# Patient Record
Sex: MALE | Race: WHITE | NOT HISPANIC OR LATINO | Employment: UNEMPLOYED | URBAN - METROPOLITAN AREA
[De-identification: names, ages, dates, MRNs, and addresses within clinical notes are randomized per-mention and may not be internally consistent; named-entity substitution may affect disease eponyms.]

---

## 2018-08-01 ENCOUNTER — OFFICE VISIT (OUTPATIENT)
Dept: INTERNAL MEDICINE | Facility: CLINIC | Age: 53
End: 2018-08-01
Attending: INTERNAL MEDICINE
Payer: COMMERCIAL

## 2018-08-01 VITALS
WEIGHT: 192.69 LBS | DIASTOLIC BLOOD PRESSURE: 70 MMHG | OXYGEN SATURATION: 98 % | BODY MASS INDEX: 26.98 KG/M2 | HEIGHT: 71 IN | HEART RATE: 58 BPM | SYSTOLIC BLOOD PRESSURE: 133 MMHG

## 2018-08-01 DIAGNOSIS — S46.911A STRAIN OF RIGHT SHOULDER, INITIAL ENCOUNTER: ICD-10-CM

## 2018-08-01 DIAGNOSIS — Z12.11 SCREENING FOR COLON CANCER: ICD-10-CM

## 2018-08-01 DIAGNOSIS — N40.1 BENIGN PROSTATIC HYPERPLASIA WITH URINARY HESITANCY: ICD-10-CM

## 2018-08-01 DIAGNOSIS — R39.11 BENIGN PROSTATIC HYPERPLASIA WITH URINARY HESITANCY: ICD-10-CM

## 2018-08-01 DIAGNOSIS — Z00.00 ANNUAL PHYSICAL EXAM: Primary | ICD-10-CM

## 2018-08-01 PROCEDURE — 99999 PR PBB SHADOW E&M-EST. PATIENT-LVL IV: CPT | Mod: PBBFAC,,, | Performed by: INTERNAL MEDICINE

## 2018-08-01 PROCEDURE — 99386 PREV VISIT NEW AGE 40-64: CPT | Mod: S$GLB,,, | Performed by: INTERNAL MEDICINE

## 2018-08-01 RX ORDER — MELOXICAM 15 MG/1
15 TABLET ORAL DAILY
Qty: 14 TABLET | Refills: 1 | Status: SHIPPED | OUTPATIENT
Start: 2018-08-01

## 2018-08-01 RX ORDER — METHOCARBAMOL 500 MG/1
500 TABLET, FILM COATED ORAL 3 TIMES DAILY PRN
Qty: 30 TABLET | Refills: 0 | Status: SHIPPED | OUTPATIENT
Start: 2018-08-01 | End: 2018-08-07 | Stop reason: ALTCHOICE

## 2018-08-01 NOTE — PROGRESS NOTES
"Subjective:   Patient ID: Reuben Harris is a 52 y.o. male  Chief complaint:   Chief Complaint   Patient presents with    [facility]    Neck Pain       HPI  Here for annual exam and to [facility]     Has been exercising regularly - cross fit   Reports shoulder and neck pain over past few weeks     Started massage therapy - few sessions - not helpful  Using ball and stretching   No weakness or numbness   Neck tightness neck pain   No dec  strength   Not dropping objects    Has tried 2 advil prior to playing golf or if going sailing which is mildly helpful    Reports occ intermittency, hesitancy. Stream is weaker at times but not currently.     No hematuria, nocturia, incomplete bladder emptying, increased frequency or urgency, burning with urination, cloudy urine. No fevers or chills, testicular pain or pain with defecation.  No family hx of prostate cancer      Review of Systems    Objective:  Vitals:    08/01/18 1507   BP: 133/70   Pulse: (!) 58   SpO2: 98%   Weight: 87.4 kg (192 lb 10.9 oz)   Height: 5' 11" (1.803 m)     Body mass index is 26.87 kg/m².    Physical Exam   Constitutional: He is oriented to person, place, and time. He appears well-developed and well-nourished.   HENT:   Head: Normocephalic and atraumatic.   Right Ear: External ear normal.   Left Ear: External ear normal.   Nose: Nose normal.   Mouth/Throat: Oropharynx is clear and moist.   No carotid bruits   Eyes: Conjunctivae and EOM are normal.   Neck: Neck supple. No thyromegaly present.   Cardiovascular: Normal rate, regular rhythm, normal heart sounds and intact distal pulses.    Pulmonary/Chest: Effort normal and breath sounds normal.   Abdominal: Soft. Bowel sounds are normal.   Genitourinary: Rectum normal.   Genitourinary Comments: Prostate firm, no nodules, mildly enlarged   Musculoskeletal: He exhibits no edema or tenderness.   No ttp of spine    strength symmetric   Full range of motion of B UE - able to fully abduct and " adduct   Sensation to light tough in tact B      Lymphadenopathy:     He has no cervical adenopathy.   Neurological: He is alert and oriented to person, place, and time.   Reflex Scores:       Bicep reflexes are 2+ on the right side and 2+ on the left side.       Brachioradialis reflexes are 2+ on the right side and 2+ on the left side.  Skin: Skin is warm and dry.        Psychiatric: His behavior is normal. Thought content normal.   Vitals reviewed.      Assessment:  1. Annual physical exam    2. Strain of right shoulder, initial encounter    3. Benign prostatic hyperplasia with urinary hesitancy    4. Screening for colon cancer        Plan:  Reuben was seen today for establish care and neck pain.    Diagnoses and all orders for this visit:    Annual physical exam  -     CBC auto differential; Future  -     Comprehensive metabolic panel; Future  -     Lipid panel; Future  -     PSA, Screening; Future  Recommend daily sunscreen, cardiovascular exercise min 30 min 5 days per week. Seatbelts routinely.    Strain of right shoulder, initial encounter  -     Ambulatory Referral to Physical/Occupational Therapy    Benign prostatic hyperplasia with urinary hesitancy  -     PSA, Screening; Future  Sx mild and intermittent   Consider trial of flomax if needed in future   Check psa     Screening for colon cancer  -     Case request GI: COLONOSCOPY    Other orders  -     meloxicam (MOBIC) 15 MG tablet; Take 1 tablet (15 mg total) by mouth once daily.  -     methocarbamol (ROBAXIN) 500 MG Tab; Take 1 tablet (500 mg total) by mouth 3 (three) times daily as needed.  -     ranitidine (ZANTAC) 75 MG tablet; Take 1 tablet (75 mg total) by mouth 2 (two) times daily.    Trial of scheduled nsaids, mm relaxer prn and PT  If no improvement he will let me know  Take nsaids with otc zantac and take med with food   To obtain tdap and flu vaccines through pharmacy  Health Maintenance   Topic Date Due    Lipid Panel  1965     TETANUS VACCINE  08/13/1985    Colonoscopy  08/13/2017    Influenza Vaccine  08/01/2018

## 2018-08-03 DIAGNOSIS — Z12.11 COLON CANCER SCREENING: ICD-10-CM

## 2018-08-05 ENCOUNTER — PATIENT MESSAGE (OUTPATIENT)
Dept: INTERNAL MEDICINE | Facility: CLINIC | Age: 53
End: 2018-08-05

## 2018-08-07 ENCOUNTER — LAB VISIT (OUTPATIENT)
Dept: LAB | Facility: OTHER | Age: 53
End: 2018-08-07
Attending: INTERNAL MEDICINE
Payer: COMMERCIAL

## 2018-08-07 ENCOUNTER — OFFICE VISIT (OUTPATIENT)
Dept: INTERNAL MEDICINE | Facility: CLINIC | Age: 53
End: 2018-08-07
Attending: INTERNAL MEDICINE
Payer: COMMERCIAL

## 2018-08-07 VITALS
WEIGHT: 190.94 LBS | HEART RATE: 68 BPM | SYSTOLIC BLOOD PRESSURE: 116 MMHG | OXYGEN SATURATION: 98 % | HEIGHT: 71 IN | DIASTOLIC BLOOD PRESSURE: 77 MMHG | BODY MASS INDEX: 26.73 KG/M2

## 2018-08-07 DIAGNOSIS — N40.1 BENIGN PROSTATIC HYPERPLASIA WITH URINARY HESITANCY: ICD-10-CM

## 2018-08-07 DIAGNOSIS — M79.601 PAIN OF RIGHT UPPER EXTREMITY: Primary | ICD-10-CM

## 2018-08-07 DIAGNOSIS — Z00.00 ANNUAL PHYSICAL EXAM: ICD-10-CM

## 2018-08-07 DIAGNOSIS — R39.11 BENIGN PROSTATIC HYPERPLASIA WITH URINARY HESITANCY: ICD-10-CM

## 2018-08-07 LAB
ALBUMIN SERPL BCP-MCNC: 4.2 G/DL
ALP SERPL-CCNC: 49 U/L
ALT SERPL W/O P-5'-P-CCNC: 10 U/L
ANION GAP SERPL CALC-SCNC: 9 MMOL/L
AST SERPL-CCNC: 13 U/L
BASOPHILS # BLD AUTO: 0.02 K/UL
BASOPHILS NFR BLD: 0.4 %
BILIRUB SERPL-MCNC: 0.6 MG/DL
BUN SERPL-MCNC: 25 MG/DL
CALCIUM SERPL-MCNC: 9.9 MG/DL
CHLORIDE SERPL-SCNC: 105 MMOL/L
CHOLEST SERPL-MCNC: 222 MG/DL
CHOLEST/HDLC SERPL: 3.8 {RATIO}
CO2 SERPL-SCNC: 27 MMOL/L
COMPLEXED PSA SERPL-MCNC: 0.4 NG/ML
CREAT SERPL-MCNC: 1.2 MG/DL
DIFFERENTIAL METHOD: NORMAL
EOSINOPHIL # BLD AUTO: 0.3 K/UL
EOSINOPHIL NFR BLD: 6 %
ERYTHROCYTE [DISTWIDTH] IN BLOOD BY AUTOMATED COUNT: 12.7 %
EST. GFR  (AFRICAN AMERICAN): >60 ML/MIN/1.73 M^2
EST. GFR  (NON AFRICAN AMERICAN): >60 ML/MIN/1.73 M^2
GLUCOSE SERPL-MCNC: 96 MG/DL
HCT VFR BLD AUTO: 45.7 %
HDLC SERPL-MCNC: 58 MG/DL
HDLC SERPL: 26.1 %
HGB BLD-MCNC: 15.3 G/DL
LDLC SERPL CALC-MCNC: 147.2 MG/DL
LYMPHOCYTES # BLD AUTO: 1.6 K/UL
LYMPHOCYTES NFR BLD: 29.5 %
MCH RBC QN AUTO: 30.8 PG
MCHC RBC AUTO-ENTMCNC: 33.5 G/DL
MCV RBC AUTO: 92 FL
MONOCYTES # BLD AUTO: 0.5 K/UL
MONOCYTES NFR BLD: 9.8 %
NEUTROPHILS # BLD AUTO: 3 K/UL
NEUTROPHILS NFR BLD: 54.1 %
NONHDLC SERPL-MCNC: 164 MG/DL
PLATELET # BLD AUTO: 198 K/UL
PMV BLD AUTO: 12.1 FL
POTASSIUM SERPL-SCNC: 4.9 MMOL/L
PROT SERPL-MCNC: 7.7 G/DL
RBC # BLD AUTO: 4.97 M/UL
SODIUM SERPL-SCNC: 141 MMOL/L
TRIGL SERPL-MCNC: 84 MG/DL
WBC # BLD AUTO: 5.49 K/UL

## 2018-08-07 PROCEDURE — 85025 COMPLETE CBC W/AUTO DIFF WBC: CPT

## 2018-08-07 PROCEDURE — 80053 COMPREHEN METABOLIC PANEL: CPT

## 2018-08-07 PROCEDURE — 36415 COLL VENOUS BLD VENIPUNCTURE: CPT

## 2018-08-07 PROCEDURE — 84153 ASSAY OF PSA TOTAL: CPT

## 2018-08-07 PROCEDURE — 80061 LIPID PANEL: CPT

## 2018-08-07 PROCEDURE — 99999 PR PBB SHADOW E&M-EST. PATIENT-LVL III: CPT | Mod: PBBFAC,,, | Performed by: INTERNAL MEDICINE

## 2018-08-07 PROCEDURE — 99499 UNLISTED E&M SERVICE: CPT | Mod: S$GLB,,, | Performed by: INTERNAL MEDICINE

## 2018-08-07 RX ORDER — METHYLPREDNISOLONE 4 MG/1
TABLET ORAL
Qty: 1 PACKAGE | Refills: 0 | Status: SHIPPED | OUTPATIENT
Start: 2018-08-07 | End: 2018-08-28

## 2018-08-07 RX ORDER — CYCLOBENZAPRINE HCL 5 MG
5 TABLET ORAL 3 TIMES DAILY PRN
Qty: 45 TABLET | Refills: 0 | Status: SHIPPED | OUTPATIENT
Start: 2018-08-07 | End: 2018-08-17

## 2018-08-08 NOTE — PROGRESS NOTES
"Subjective:   Patient ID: Reuben Harris is a 52 y.o. male  Chief complaint:   Chief Complaint   Patient presents with    Arm Pain     right arm    Shoulder Pain     right soulder       HPI  Pt scheduled for appt instead of update received per lcv instructions   Sx same but not improved on recent tx - methocarbamol not effective   meloxicam not helpful  Still with right post shoulder and arm pain  No new sx since last recent appt - starting PT next week    Review of Systems    Objective:  Vitals:    08/07/18 1032   BP: 116/77   Pulse: 68   SpO2: 98%   Weight: 86.6 kg (190 lb 14.7 oz)   Height: 5' 11" (1.803 m)     Body mass index is 26.63 kg/m².    Physical Exam   Constitutional: He is oriented to person, place, and time. He appears well-developed and well-nourished. No distress.   HENT:   Head: Normocephalic and atraumatic.   Eyes: Conjunctivae and EOM are normal.   Neck: Neck supple.   Cardiovascular: Normal rate, regular rhythm and intact distal pulses.    Pulmonary/Chest: Effort normal and breath sounds normal.   Musculoskeletal: He exhibits no edema.   From of shae UE  Bilateral paraspinal and traps tight   strength symmetric and in tact   5/5 strength of B UE  No rash or skin change   No ttp of right UE   Lymphadenopathy:     He has no cervical adenopathy.   Neurological: He is alert and oriented to person, place, and time.   Skin: Skin is warm and dry. He is not diaphoretic.   Psychiatric: He has a normal mood and affect. His behavior is normal. Judgment and thought content normal.   Vitals reviewed.    Unchanged since last appt    Assessment:  1. Pain of right upper extremity        Plan:  Reuben was seen today for arm pain and shoulder pain.    Diagnoses and all orders for this visit:    Pain of right upper extremity    Other orders  -     methylPREDNISolone (MEDROL DOSEPACK) 4 mg tablet; use as directed  -     cyclobenzaprine (FLEXERIL) 5 MG tablet; Take 1 tablet (5 mg total) by mouth 3 (three) " times daily as needed for Muscle spasms.      Switch mm relaxer to flexeril 5-10mg prn - do not drive while taking   Stop nsaids and start medrol dose pack   Resume meloxicam after completes med if needed prn vs switch to another nsaid - he will send message in 1-2 weeks with update to plan   Will consider ortho eval, xray shoulder pending sx  Health Maintenance   Topic Date Due    Lipid Panel  1967    Colonoscopy  08/13/2017    Influenza Vaccine  08/01/2018    TETANUS VACCINE  08/01/2028

## 2018-08-10 ENCOUNTER — CLINICAL SUPPORT (OUTPATIENT)
Dept: REHABILITATION | Facility: HOSPITAL | Age: 53
End: 2018-08-10
Attending: INTERNAL MEDICINE
Payer: COMMERCIAL

## 2018-08-10 DIAGNOSIS — M25.511 ACUTE PAIN OF RIGHT SHOULDER: ICD-10-CM

## 2018-08-10 DIAGNOSIS — M54.12 CERVICAL RADICULOPATHY: ICD-10-CM

## 2018-08-10 PROCEDURE — 97161 PT EVAL LOW COMPLEX 20 MIN: CPT | Mod: PO

## 2018-08-10 PROCEDURE — 97110 THERAPEUTIC EXERCISES: CPT | Mod: PO

## 2018-08-10 NOTE — PLAN OF CARE
[facility]  Physical Therapy Initial Evaluation    Name: Reuben Harris  Clinic Number: [id number]    Therapy Diagnosis:   Diagnosis:   Encounter Diagnoses   Name Primary?    Acute pain of right shoulder     Cervical radiculopathy      Physician: Grace Panchal MD    Physician Orders: PT Eval and Treat R shoulder strain  Medical Diagnosis from Referral: Strain of R shoulder, initial encounter  Evaluation Date: 8/10/2018  Authorization Period Expiration: 8/1/19  Plan of Care Expiration: 10/10/18  Visit # / Visits authorized: 1/ 1    Time In: 9:00  Time Out: 10:00  Total Billable Time: 60 minutes    Precautions: Standard    Subjective   Date of onset: 7/17/18  History of current condition - Quaker reports: he has been experiencing R shoulder pain for about 3 weeks now. Pt reports he is unsure how the pain started. Pt reports in the week leading up he had been participating in Crossfit (has been going since 2011), and the exercises involved sumo deadlifts w/ high pull and hang cleans. Pt states he was seen by a massage therapist of two occasions since the pain started, reports soreness the following day which subsided and allowed him to feel better, however the pain returned a few days later. Pt reports he has started to notice radiating pain down his R arm and numbness into his hand, also reports the R UE feels weak however when attempting to lift anything heavy he does not notice any strength discrepancies L vs R . Pt reports the pain is worse when internally rotating his shoulders (working on the computer, eating) and when putting ice on it (he feels that he tenses up; denies trying heat). Pt reports the pain is better with rest and laying supine, unsure if lacrosse ball STM against wall is helping in upper traps. Pt denies any neck pain. Pt reports he is ambidextrous       No past medical history on file.  Reuben Harris  has a past surgical history that includes Pleural  "scarification.    Reuben has a current medication list which includes the following prescription(s): cyclobenzaprine, meloxicam, methylprednisolone, and ranitidine.    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, using   Prior Level of Function: Independent with all ADLs and participating in weight training  Current Level of Function: Pt reports difficulty with eating, computer work and while lifting weights    Pain:  Current 3/10, worst 9/10, best 3/10   Location: left arms and neck   Description: Aching, Dull, Numb and Shooting  Aggravating Factors: Eating, Night Time, Flexing and Lifting  Easing Factors: pain medication and rest    Pts goals: Pt would like to be able to return to crossfit, be able to perform ADLs without increased pain and decrease risk for further injury    Objective     Posture: FHP, FSP    CERVICAL SPINE AROM:   Flexion: WNL   Extension: WNL "stiff"   Left Sidebend: 80% "stiff"   Right Sidebend: WNL   Left Rotation: 80% "stiff"   Right Rotation: WNL "feel it in R neck     SEGMENTAL MOBILITY: segmental side bend w/ transverse glide WNL bilat, SB to the R (+) for radicular symptoms down the R arm into the hand    Joint mobility: post joint capsule demonstrates decreased mobility    Shoulder AROM: WNL and symptom free bilat    MMT   Right Left Comment   Shoulder flexion: 5/5 5/5    Shoulder Abduction: 5/5 5/5    Shoulder ER: 5/5 5/5    Shoulder IR: 5/5 5/5    Lower Trap: 4-/5 4-/5    Middle Trap: 4/5 4/5     5/5 5/5      Special Tests    - Neers: right negative  - Christianson-Cliff: right negative  - Lift-off: right negative  - Drop Arm: right negative  - Full/Empty Can: right negative  - Speeds: right negative  - Biceps Load I/II: right negative  - Crank Test: right negative  - Apprehension/Relocation: right " positive ; pt reports mild pain when relieving pressure on anterior shoulder  - Cervical traction (+) for relieving radicular symptoms  - compression (-) for provoking symptoms    ULTT: median, ulnar and radian n. (-) for neural tension on R    Able to provoke radicular symptoms into R arm with active cervical retraction however all cardinal plane AROM (-) for symptoms (potentially due to FHP)      TREATMENT   Treatment Time In: 9:43  Treatment Time Out: 10:00  Total Treatment time separate from Evaluation: 17 minutes    Reuben received therapeutic exercises to develop strength, endurance, ROM, flexibility and posture for 11 minutes including:    Shoulder retractions 3 sec holds 2x10  Chin tuck (symptom free range) 3 sec holds 2x10  Brueggars (bilat ER against wall w/ retraction) 2x10 3 sec holds  Straight arm rows GTB 2x10    Reuben received the following manual therapy techniques: Joint mobilizations, Manual traction, Myofacial release and Soft tissue Mobilization were applied to the: R shoulder for 6 minutes, including:    STM to bilat scalenes, suboccipitals, levator, upper traps, pec minor  R Post GH glide Gr II-III  Manual cervical traction    Home Exercises and Patient Education Provided    Education provided re: What to avoid when lifting, radicular symptoms, cervical arthrokinematics, POC, role of PT    Written Home Exercises Provided: All exercises performed during today's treatment were printed and given to pt.  Exercises were reviewed and Reuben was able to demonstrate them prior to the end of the session.   Pt received a written copy of exercises to perform at home. Reuben demonstrated good  understanding of the education provided.     Assessment   Reuben is a 52 y.o. male referred to outpatient Physical Therapy with a medical diagnosis of R shoulder pain. Pt presents with decreased strength, decreased ROM, decreased flexibility, faulty posture, radicular symptoms, and increased pain. Due  to impairments, pt is unable to use his laptop, eat and participate in weight lifting class.     Pt prognosis is Good.   Pt will benefit from skilled outpatient Physical Therapy to address the deficits stated above and in the chart below, provide pt/family education, and to maximize pt's level of independence.     Plan of care discussed with patient: Yes  Pt's spiritual, cultural and educational needs considered and patient is agreeable to the plan of care and goals as stated below:     Anticipated Barriers for therapy: n/a    Medical Necessity is demonstrated by the following  History  Co-morbidities and personal factors that may impact the plan of care Co-morbidities:   n/a    Personal Factors:   no deficits     low   Examination  Body Structures and Functions, activity limitations and participation restrictions that may impact the plan of care Body Regions:   upper extremities    Body Systems:    strength    Participation Restrictions:   Cross fit    Activity limitations:   Learning and applying knowledge  no deficits    General Tasks and Commands  no deficits    Communication  no deficits    Mobility  no deficits    Self care  no deficits    Domestic Life  no deficits    Interactions/Relationships  no deficits    Life Areas  no deficits    Community and Social Life  no deficits         low   Clinical Presentation stable and uncomplicated low   Decision Making/ Complexity Score: low     Goals:  Short Term Goals: 3 weeks   1. Pt will be able to perform 2x10 chin tuck w/ full AROM reporting no symptoms into his R UE  2. Pt will report no radicular symptoms into R UE with PROM into R side bend  3. Pt will be independent with HEP    Long Term Goals: 8 weeks   1. Pt will demonstrate low trap and mid trap MMT of at least 4+/5 to demonstrate improve scapular stability   2. Pt will be able to participate in Crossfit at [facility], making modifications when necessary reporting less than 2/10 pain  3. Pt will be able to utilize  laptop (taking stretch breaks when necessary) and eat reporting no radiating symptoms and less than 2/10 pain    Plan   Plan of care Certification: 8/10/2018 to 10/10/18.    Outpatient Physical Therapy 2 times weekly for 8 weeks to include the following interventions: Cervical/Lumbar Traction, Manual Therapy, Moist Heat/ Ice, Neuromuscular Re-ed, Patient Education, Self Care, Therapeutic Activites, Therapeutic Exercise and Ultrasound.     Nnamdi Jasso, PT

## 2018-08-15 ENCOUNTER — CLINICAL SUPPORT (OUTPATIENT)
Dept: REHABILITATION | Facility: HOSPITAL | Age: 53
End: 2018-08-15
Attending: INTERNAL MEDICINE
Payer: COMMERCIAL

## 2018-08-15 DIAGNOSIS — M54.12 CERVICAL RADICULOPATHY: ICD-10-CM

## 2018-08-15 DIAGNOSIS — M25.511 ACUTE PAIN OF RIGHT SHOULDER: ICD-10-CM

## 2018-08-15 PROCEDURE — 97140 MANUAL THERAPY 1/> REGIONS: CPT | Mod: PO

## 2018-08-15 PROCEDURE — 97110 THERAPEUTIC EXERCISES: CPT | Mod: PO

## 2018-08-15 NOTE — PROGRESS NOTES
Name: Reuben Harris  Clinic Number: [id number]  Date of Treatment: 08/15/2018   Diagnosis:   Encounter Diagnoses   Name Primary?    Acute pain of right shoulder     Cervical radiculopathy        Physician: Grace Panchal MD    Time in: 9:00  Time Out: 9:48  Total Treatment Time: 48 min  Date of eval: 8/10/18  Visit #: 2/20  Auth expiration: 8/1/19  POC expiration: 10/10/18    Precautions: Standard    Subjective     Mu-ism reports he was not sore after the evaluation. Pt admits he has not had time to do many of the exercises due to moving daughter in at school  Pt reports his pain has been under control for the most part unless he moves his neck/ arm in a weird way. Patient reports their pain to be 2/10 (can get to 8/10 if moving head/shoulder in a weird way) on a 0-10 scale with 0 being no pain and 10 being the worst pain imaginable.    Objective     Mu-ism received therapeutic exercises to develop strength, endurance, ROM, flexibility, posture and core stabilization for 32 minutes including:     Supine:    D2 flex/extension 2x10  Rhythmic stabilization (at 90/ 135/ 180 deg flexion) 5 min    Prone:    3# straight arm row 2x15  5# bent arm row 2x10    Standing:    Shoulder retractions 3 sec holds 2x10  Chin tuck (symptom free range) 3 sec holds 2x10  Brueggars (bilat retraction holding towel) 2x10 3 sec holds  Straight arm rows GTB 2x10  Foam roller serratus wall slides 2x10  Wall clock OTB 2x10  serratus wall slide OTB 2x10    Lacrosse ball to upper traps 5 min    Mu-ism received the following manual therapy techniques: Joint mobilizations, Manual traction, Myofacial release and Soft tissue Mobilization were applied to the: neck and L shoulder for 16 minutes.     STM to bilat scalenes, suboccipitals, levator, upper traps, pec minor  R Post GH glide Gr II-III  Manual suboccipital stretch  C3 upglide for L rotation   Manual cervical traction    Written Home Exercises Provided: serratus wall slides  OTB, OTB wall clocks    Pt educated on posture, neural tension, cervical arthrokinematics. Pt demo good understanding of the education provided. Reuben demonstrated good return demonstration of activities.     Assessment   Reuben participated in today's treatment session without symptom provocation or adverse effects. Pt demonstrated increased upper trap recruitment during some HEP exercises however with verbal/tactile cuing able to perform with proper muscle activation. Pt continues to reports symptoms with excessive cervical traction or side bend, pt educated to avoid those positions for now. Pt tolerates all manual and exercises well, will continue to progress based on patient tolerance.     Pt will continue to benefit from skilled PT intervention. Medical Necessity is demonstrated by:  Unable to participate fully in daily activities, Requires skilled supervision to complete and progress HEP and Weakness.    Patient is making good progress towards established goals.    New/Revised goals: none at this time    Short Term Goals: 3 weeks   1. Pt will be able to perform 2x10 chin tuck w/ full AROM reporting no symptoms into his R UE  2. Pt will report no radicular symptoms into R UE with PROM into R side bend  3. Pt will be independent with HEP     Long Term Goals: 8 weeks   1. Pt will demonstrate low trap and mid trap MMT of at least 4+/5 to demonstrate improve scapular stability   2. Pt will be able to participate in Crossfit at [facility], making modifications when necessary reporting less than 2/10 pain  3. Pt will be able to utilize laptop (taking stretch breaks when necessary) and eat reporting no radiating symptoms and less than 2/10 pain    Plan     Next: D2 flex in standing w/ dowel, protraction plank    Continue with established Plan of Care towards PT goals.

## 2018-08-21 ENCOUNTER — CLINICAL SUPPORT (OUTPATIENT)
Dept: REHABILITATION | Facility: HOSPITAL | Age: 53
End: 2018-08-21
Attending: INTERNAL MEDICINE
Payer: COMMERCIAL

## 2018-08-21 DIAGNOSIS — M54.12 CERVICAL RADICULOPATHY: ICD-10-CM

## 2018-08-21 DIAGNOSIS — M25.511 ACUTE PAIN OF RIGHT SHOULDER: Primary | ICD-10-CM

## 2018-08-21 PROCEDURE — 97110 THERAPEUTIC EXERCISES: CPT | Mod: PO

## 2018-08-21 PROCEDURE — 97140 MANUAL THERAPY 1/> REGIONS: CPT | Mod: PO

## 2018-08-21 NOTE — PROGRESS NOTES
Name: Reuben Harris  Clinic Number: [id number]  Date of Treatment: 08/21/2018   Diagnosis:   Encounter Diagnoses   Name Primary?    Acute pain of right shoulder Yes    Cervical radiculopathy        Physician: Grace Panchal MD    Time in:9:05  Time Out: 9:55  Total Treatment Time: 50 min  Date of eval: 8/10/18  Visit #: 3/20  Auth expiration: 8/1/19  POC expiration: 10/10/18    Precautions: Standard    Subjective     Quaker report s min residual soreness due to participation a sailing race over the weekend.    Patient reports their pain to be 1-2/10  To (R) shoulder.    Objective     Quaker received therapeutic exercises to develop strength, endurance, ROM, flexibility, posture and core stabilization for  40 minutes including:     Lacrosse ball to (R) upper trap  5 min    Standing:  Shoulder retractions  With GTB 3 sec holds 2x10  Straight arm rows GTB 2x10  Chin tuck (symptom free range) 3 sec holds 2x10--NP  Deep neck flexion with ball on the wall 2 x 10  Brueggars (bilat retraction with YTB )  2x10 3 sec holds  Foam roller serratus wall slides 2x10  Wall clock OTB 2x10  serratus wall slide OTB 2x10     Supine:    D2 flex/extension with min manual resistance  x10  Rhythmic stabilization (at 90/  Degrees  Flexion       Prone:    3# straight arm row 2x15  5# bent arm row 2x10    Quaker received the following manual therapy techniques: Joint mobilizations, Manual traction, Myofacial release and Soft tissue Mobilization were applied to the: neck and L shoulder for 10 minutes.     STM to bilat scalenes, suboccipitals, levator, upper traps, pec minor  R Post GH glide Gr II-III  Manual suboccipital stretch  C3 upglide for L rotation --NP  Manual cervical traction--NP    Written Home Exercises Provided: Patient educated to continue with previously issued HEP to tolerance along with updated  YTB resistance for Brueggers ex.  Pt educated on posture, neural tension, cervical arthrokinematics. Pt demo good  understanding of the education provided. Reuben demonstrated good return demonstration of activities.     Assessment   Reuben participated in today's treatment session without symptom provocation or adverse effects.  Minimal cueing for postural awareness but did well overall with progression of ex performance. Some (R) side UT tension evident that was relieved with soft tissue work.   Discomfort level = slight post TX..  Will monitor and continue to progress based on patient tolerance.     Pt will continue to benefit from skilled PT intervention. Medical Necessity is demonstrated by:  Unable to participate fully in daily activities, Requires skilled supervision to complete and progress HEP and Weakness.    Patient is making good progress towards established goals.    New/Revised goals: none at this time    Short Term Goals: 3 weeks   1. Pt will be able to perform 2x10 chin tuck w/ full AROM reporting no symptoms into his R UE  2. Pt will report no radicular symptoms into R UE with PROM into R side bend  3. Pt will be independent with HEP     Long Term Goals: 8 weeks   1. Pt will demonstrate low trap and mid trap MMT of at least 4+/5 to demonstrate improve scapular stability   2. Pt will be able to participate in Crossfit at [facility], making modifications when necessary reporting less than 2/10 pain  3. Pt will be able to utilize laptop (taking stretch breaks when necessary) and eat reporting no radiating symptoms and less than 2/10 pain    Plan     Next: D2 flex in standing w/ dowel, protraction plank    Continue with established Plan of Care towards PT goals.

## 2018-08-24 ENCOUNTER — CLINICAL SUPPORT (OUTPATIENT)
Dept: REHABILITATION | Facility: HOSPITAL | Age: 53
End: 2018-08-24
Attending: INTERNAL MEDICINE
Payer: COMMERCIAL

## 2018-08-24 DIAGNOSIS — M54.12 CERVICAL RADICULOPATHY: Primary | ICD-10-CM

## 2018-08-24 DIAGNOSIS — M25.511 ACUTE PAIN OF RIGHT SHOULDER: ICD-10-CM

## 2018-08-24 PROCEDURE — 97110 THERAPEUTIC EXERCISES: CPT | Mod: PO

## 2018-08-24 PROCEDURE — 97140 MANUAL THERAPY 1/> REGIONS: CPT | Mod: PO

## 2018-08-24 PROCEDURE — 97112 NEUROMUSCULAR REEDUCATION: CPT | Mod: PO

## 2018-08-24 NOTE — PROGRESS NOTES
Name: Reuben Harris  Clinic Number: [id number]  Date of Treatment: 08/24/2018   Diagnosis:   Encounter Diagnoses   Name Primary?    Cervical radiculopathy Yes    Acute pain of right shoulder        Physician: Grace Panchal MD    Time in:9:00  Time Out: 9:45  Total Treatment Time: 45 min  Date of eval: 8/10/18  Visit #: 4/20  Auth expiration: 8/1/19  POC expiration: 10/10/18    Precautions: Standard    Subjective     Episcopalian reports his shoulder is feeling good, gets twinges of pain in certain positions. Has noticed some radicular symptoms down his arm into his L 1st/2nd digit, however reports with postural adjustments symptoms subside   Patient reports their pain to be 2/10  To (R) shoulder.    Objective     Episcopalian received therapeutic exercises to develop strength, endurance, ROM, flexibility, posture and core stabilization for  22 minutes including:     UE bike 2 min front 2 min back    Lacrosse ball to (R) upper trap  5 min - NP    Standing:  Shoulder retractions  With GTB 3 sec holds 2x10  Straight arm rows GTB 3x10  Chin tuck (symptom free range) 3 sec holds 2x10--NP  Deep neck flexion with ball on the wall 2 x 10 - NP  Brueggars (bilat retraction with YTB )  2x10 3 sec holds  Foam roller serratus wall slides 2x10  Protraction planks on wall 2x10 - NP  Wall clock OTB 2x10 - NP  serratus wall slide OTB 2x10  D2 AAROM with dowel (R shoulder only overhead, focus on low trap engagement while overhead) 2x10     Supine:    D2 flex/extension with mod manual resistance  x10  Rhythmic stabilization (at 90 degrees flexion) - NP  Scapular protraction (head elevated 30 deg) 9# 3x10    Prone:    3# straight arm row 2x15  5# bent arm row 2x10    Episcopalian received the following manual therapy techniques: Joint mobilizations, Manual traction, Myofacial release and Soft tissue Mobilization were applied to the: neck and L shoulder for 10 minutes.     STM to bilat scalenes, suboccipitals, levator, upper traps, pec  minor  R Post GH glide Gr II-III  Manual suboccipital stretch  C3 upglide for L rotation --NP  Manual cervical traction--NP    Reuben participated in neuromuscular re-education activities to improve: Kinesthetic, Sense, Proprioception and Posture for 13 minutes. The following activities were included:    Manual resistance into scapular retraction and depression with rhythmic stab 5 min   Isometric holds in overhead position while in prone, manual cuing to achieve scapular retraction/depression      Written Home Exercises Provided: Patient educated to continue with previously issued HEP to tolerance along with updated  YTB resistance for Brueggers ex.  Pt educated on posture, neural tension, cervical arthrokinematics. Pt demo good understanding of the education provided. Reuben demonstrated good return demonstration of activities.     Assessment   Reuben presents with continued neural tension with radial n. Pt benefits from cuing to reduce upper trap recruitment while stabilizing scapular. Pt tolerates all progressed exercises well. Pt reports he feels he is unable to protract his R scapula as much as L. Pt benefits from prone shoulder stability work with verbal and tactile cuing to achieve scapular depressed and retracted position    Pt will continue to benefit from skilled PT intervention. Medical Necessity is demonstrated by:  Unable to participate fully in daily activities, Requires skilled supervision to complete and progress HEP and Weakness.    Patient is making good progress towards established goals.    New/Revised goals: none at this time    Short Term Goals: 3 weeks   1. Pt will be able to perform 2x10 chin tuck w/ full AROM reporting no symptoms into his R UE  2. Pt will report no radicular symptoms into R UE with PROM into R side bend  3. Pt will be independent with HEP     Long Term Goals: 8 weeks   1. Pt will demonstrate low trap and mid trap MMT of at least 4+/5 to demonstrate improve  scapular stability   2. Pt will be able to participate in Crossfit at [facility], making modifications when necessary reporting less than 2/10 pain  3. Pt will be able to utilize laptop (taking stretch breaks when necessary) and eat reporting no radiating symptoms and less than 2/10 pain    Plan     Next: single arm bent over rows, cable rows, OH pull down    Continue with established Plan of Care towards PT goals.

## 2018-08-27 ENCOUNTER — CLINICAL SUPPORT (OUTPATIENT)
Dept: REHABILITATION | Facility: HOSPITAL | Age: 53
End: 2018-08-27
Attending: INTERNAL MEDICINE
Payer: COMMERCIAL

## 2018-08-27 DIAGNOSIS — M54.12 CERVICAL RADICULOPATHY: ICD-10-CM

## 2018-08-27 DIAGNOSIS — M25.511 ACUTE PAIN OF RIGHT SHOULDER: ICD-10-CM

## 2018-08-27 PROCEDURE — 97110 THERAPEUTIC EXERCISES: CPT | Mod: PO

## 2018-08-27 PROCEDURE — 97140 MANUAL THERAPY 1/> REGIONS: CPT | Mod: PO

## 2018-08-27 NOTE — PROGRESS NOTES
Name: Reuben Harris  Clinic Number: [id number]  Date of Treatment: 08/27/2018   Diagnosis:   Encounter Diagnoses   Name Primary?    Acute pain of right shoulder     Cervical radiculopathy        Physician: Grace Panchal MD    Time in:9:00  Time Out: 9:47  Total Treatment Time: 45 min  Date of eval: 8/10/18  Visit #: 5/20  Auth expiration: 8/1/19  POC expiration: 10/10/18    Precautions: Standard    Subjective     Reuben reports he went golfing and sailing this weekend and on his drive home he noticed the tingling down his R arm. Pt notices he is able to achieve cervical traction and chin tuck without increase in pain and symptoms down his R UE.   Patient reports their pain to be 2/10  To (R) shoulder.    Objective     Reuben received therapeutic exercises to develop strength, endurance, ROM, flexibility, posture and core stabilization for  32 minutes including:     UE bike 2 min front 2 min back    Lacrosse ball to (R) upper trap  5 min - NP    Standing:  Shoulder retractions  With GTB 3 sec holds 2x10 - NP  Straight arm rows GTB 3x10  Chin tuck (symptom free range) 3 sec holds 2x10  Deep neck flexion with ball on the wall 2 x 10 - NP  Brueggars (bilat retraction with YTB )  2x10 3 sec holds  Foam roller serratus wall slides 2x10  Protraction planks on wall 2x10 - NP  Wall clock OTB 2x10  serratus wall slide OTB 2x10  D2 AAROM with dowel (R shoulder only overhead, focus on low trap engagement while overhead) 2x10     Supine:    D2 flex/extension with mod manual resistance  x10  Rhythmic stabilization (at 90 degrees flexion) - NP  Scapular protraction (head elevated 30 deg) blue med ball 3x10  Open close book 1x15    Prone:    3# straight arm row 2x15 - NP  5# bent arm row 2x10 - NP    Reuben received the following manual therapy techniques: Joint mobilizations, Manual traction, Myofacial release and Soft tissue Mobilization were applied to the: neck and L shoulder for 15 minutes.     STM to bilat  scalenes, suboccipitals, levator, upper traps, pec minor  R Post GH glide Gr II-III  Manual suboccipital stretch  C3 upglide for L rotation --NP  Manual cervical traction--NP    Reuben participated in neuromuscular re-education activities to improve: Kinesthetic, Sense, Proprioception and Posture for 0 minutes. The following activities were included:    Manual resistance into scapular retraction and depression with rhythmic stab 5 min   Isometric holds in overhead position while in prone, manual cuing to achieve scapular retraction/depression      Written Home Exercises Provided: Patient educated to continue with previously issued HEP to tolerance along with updated  YTB resistance for Brueggers ex.  Pt educated on posture, neural tension, cervical arthrokinematics. Pt demo good understanding of the education provided. Reuben demonstrated good return demonstration of activities.     Assessment   Reuben presents with increased tissue density / TTP to pec minor / major, benefits from STM/ stretching. Pt demonstrates negative on Neers and H-K in R shoulder today. Pt responds well to manual radial n. [place]. Pt able to perform active radial n. Glide well without reports of symptoms. Pt educated not to perform nerve glide too much and what symptoms he should avoid. Pt demonstrates increased thoracic rotation towards the L, benefits from thoracic mobility exercises and R scap protraction.     Pt will continue to benefit from skilled PT intervention. Medical Necessity is demonstrated by:  Unable to participate fully in daily activities, Requires skilled supervision to complete and progress HEP and Weakness.    Patient is making good progress towards established goals.    New/Revised goals: none at this time    Short Term Goals: 3 weeks   1. Pt will be able to perform 2x10 chin tuck w/ full AROM reporting no symptoms into his R UE  2. Pt will report no radicular symptoms into R UE with PROM into R side bend  3. Pt  will be independent with HEP     Long Term Goals: 8 weeks   1. Pt will demonstrate low trap and mid trap MMT of at least 4+/5 to demonstrate improve scapular stability   2. Pt will be able to participate in Crossfit at [facility], making modifications when necessary reporting less than 2/10 pain  3. Pt will be able to utilize laptop (taking stretch breaks when necessary) and eat reporting no radiating symptoms and less than 2/10 pain    Plan     Next: single arm bent over rows, cable rows, OH pull down, thoracic wedge PA/ mobility drills, FR crunches    Continue with established Plan of Care towards PT goals.

## 2018-09-12 ENCOUNTER — CLINICAL SUPPORT (OUTPATIENT)
Dept: REHABILITATION | Facility: HOSPITAL | Age: 53
End: 2018-09-12
Attending: INTERNAL MEDICINE
Payer: COMMERCIAL

## 2018-09-12 DIAGNOSIS — M54.12 CERVICAL RADICULOPATHY: ICD-10-CM

## 2018-09-12 DIAGNOSIS — M25.511 ACUTE PAIN OF RIGHT SHOULDER: Primary | ICD-10-CM

## 2018-09-12 PROCEDURE — 97110 THERAPEUTIC EXERCISES: CPT | Mod: PO

## 2018-09-12 NOTE — PROGRESS NOTES
Name: Reuben Harris  Clinic Number: [id number]  Date of Treatment: 09/12/2018   Diagnosis:   Encounter Diagnoses   Name Primary?    Acute pain of right shoulder Yes    Cervical radiculopathy        Physician: Grace Panchal MD    Time in:9:08  Time Out: 10:00  Total Treatment Time: 50 min  Date of eval: 8/10/18  Visit #: 6/20  Auth expiration: 8/1/19  POC expiration: 10/10/18    Precautions: Standard    Subjective     Reuben reports feeling better overall. Reports some (R) UE numbness when he sits in a slouched posture at the computer but knows how to self correct which alleviates symptoms.    Patient reports their pain to be 0/10  To (R) shoulder presently. .    Objective     Reuben received therapeutic exercises to develop strength, endurance, ROM, flexibility, posture and core stabilization for  40 minutes including:     UBE x 5 minutes ( 2.5 minutes forward/backward)    Lacrosse ball to (R) upper trap  5 min - NP    Standing:  Shoulder retractions  With GTB 3 sec holds 2x10 - NP  Straight arm rows on cable cross with 7#  2x10  Scap retract/Row ( bent arm) on cable cross with 10# 2 x 10  Chin tuck (symptom free range) 3 sec holds 2x10  Deep neck flexion with ball on the wall 2 x 10 - NP  Brueggars (bilat retraction with OTB )  2x10 3 sec holds  Foam roller serratus wall slides 2x10  Protraction planks on wall 2x10 - NP  Wall clock OTB 2x10 ( 1:00, 3:00, 5:00)  serratus wall slide OTB 2x10--NP  D2 AAROM with dowel (R shoulder only overhead, focus on low trap engagement while overhead) 2x10 --NP  Middle trap lift off against wall 2 x 10  Lower trap  lift off against wall 2 x 10  PNF D2 flex with YTB resistance 2 x 10    Next visit: Body blade oscillations)     Supine:  D2 flex/extension with mod manual resistance  x10--NP  Rhythmic stabilization (at 90 degrees flexion)    Scapular protraction (head elevated 30 deg) blue med ball 3x10  Open close book 1x15  OH pulldowns with OTB 2 x 10  Foam roller  crunches 2 x 10    Prone:  Bent over rows (bench support) with 10# 2  x10  3# straight arm row 2x15 - NP  5# bent arm row 2x10 - NP    Reuben received the following manual therapy techniques:   Soft tissue Mobilization were applied to the: neck and L shoulder for 10 minutes.     STM to bilat scalenes, suboccipitals, levator, upper traps, pec minor  R Post GH glide Gr II-III  Manual suboccipital stretch--NP  C3 upglide for L rotation --NP  Manual cervical traction--NP    Reuben participated in neuromuscular re-education activities to improve: Kinesthetic, Sense, Proprioception and Posture for 0 minutes. The following activities were included:    Manual resistance into scapular retraction and depression with rhythmic stab 5 min   Isometric holds in overhead position while in prone, manual cuing to achieve scapular retraction/depression      Written Home Exercises Provided: Patient educated to continue with previously issued HEP to tolerance  Along with updated HEP to include: PNF D2 flex with YTB..    Pt demo good understanding of the education provided. Reuben demonstrated good return demonstration of activities.     Assessment   Reuben tolerated Tx well.  He was able to perform and progress with ex's/activities within appropriate level of muscular fatigue and without increased pain or numbness.  Appears to have improved overall postural awareness and symptom control.  Improved stability with performance of rhythmic stabilization ex. WIll monitor and attempt to progress with ex's/activities as tolerated.    Pt will continue to benefit from skilled PT intervention. Medical Necessity is demonstrated by:  Unable to participate fully in daily activities, Requires skilled supervision to complete and progress HEP and Weakness.    Patient is making good progress towards established goals.    New/Revised goals: none at this time    Short Term Goals: 3 weeks   1. Pt will be able to perform 2x10 chin tuck w/ full  AROM reporting no symptoms into his R UE  2. Pt will report no radicular symptoms into R UE with PROM into R side bend  3. Pt will be independent with HEP     Long Term Goals: 8 weeks   1. Pt will demonstrate low trap and mid trap MMT of at least 4+/5 to demonstrate improve scapular stability   2. Pt will be able to participate in Crossfit at [facility] [address] [facility], making modifications when necessary reporting less than 2/10 pain  3. Pt will be able to utilize laptop (taking stretch breaks when necessary) and eat reporting no radiating symptoms and less than 2/10 pain    Plan     Next: single arm bent over rows, cable rows, OH pull down, thoracic wedge PA/ mobility drills, FR crunches    Continue with established Plan of Care towards PT goals.

## 2018-09-19 ENCOUNTER — CLINICAL SUPPORT (OUTPATIENT)
Dept: REHABILITATION | Facility: HOSPITAL | Age: 53
End: 2018-09-19
Attending: INTERNAL MEDICINE
Payer: COMMERCIAL

## 2018-09-19 DIAGNOSIS — M25.511 ACUTE PAIN OF RIGHT SHOULDER: ICD-10-CM

## 2018-09-19 DIAGNOSIS — M54.12 CERVICAL RADICULOPATHY: ICD-10-CM

## 2018-09-19 PROCEDURE — 97110 THERAPEUTIC EXERCISES: CPT | Mod: PO

## 2018-09-19 NOTE — PROGRESS NOTES
"Name: Reuben Harris  Clinic Number: [id number]  Date of Treatment: 09/19/2018   Diagnosis:   Encounter Diagnoses   Name Primary?    Acute pain of right shoulder     Cervical radiculopathy        Physician: Grace Panchal MD    Time in:9:05  Time Out: 10:00  Total Treatment Time: 50 min  Date of eval: 8/10/18  Visit #: 7/20  Auth expiration: 8/1/19  POC expiration: 10/10/18    Precautions: Standard    Subjective     Reuben reports feeling better overall. Reports some (R) UE numbness when he sits in a slouched posture at the computer but knows how to self correct which alleviates symptoms.  States that he was able to sail all weekend without c/o.  He reports min delayed onset muscle soreness after ex's which resolves.   Patient reports their pain to be 0/10  To (R) shoulder presently. .    Objective     Reuben received therapeutic exercises to develop strength, endurance, ROM, flexibility, posture and core stabilization for  50 minutes including:      Ex's in bold performed today     UBE x 5 minutes ( 2.5 minutes forward/backward)    Lacrosse ball to (R) upper trap  5 min - NP    Standing:  Shoulder retractions  With GTB 3 sec holds 2x10 - NP  Straight arm rows on cable cross with 7#  2x10  Scap retract/Row ( bent arm) on cable cross with 10# 2 x 10  Chin tuck (symptom free range) 3 sec holds 2x10  Deep neck flexion with ball on the wall 2 x 10 - NP  Brueggars (bilat retraction with OTB )  2x10 3 sec holds  Foam roller serratus wall slides 2x10  Protraction planks on wall 2x10 - NP  Wall clock OTB 2x10 ( 1:00, 3:00, 5:00)  serratus wall slide OTB 2x10--NP  D2 AAROM with dowel (R shoulder only overhead, focus on low trap engagement while overhead) 2x10 --NP  Middle trap lift off prone over t-ball with 1# 2 x 10 ( "T")   Lower trap  lift off prone over t-ball 2 x 10  With 1# ("Y')  PNF D2 flex with OTB resistance 2 x 10  Body blade oscillations vertical and horizontal planes 2 x 30 sec  Prone walkouts " with LE's supported on t-ball x 10   Cable cross chop downs with dowel in 1/2 kneeling 2 x 10 (B) with 10#      Supine:  D2 flex/extension with mod manual resistance  x10--NP  Rhythmic stabilization (at 90 degrees flexion)    Scapular protraction (head elevated 30 deg) blue med ball 3x10  Open close book 1x15  OH pulldowns on shuttle with 1 black band 2 x 10  Foam roller crunches 2 x 10    Prone:  Bent over rows (bench support) with 10# 2  x12  3# straight arm row 2x15 - NP  5# bent arm row 2x10 - NP    Reuben received the following manual therapy techniques:   Soft tissue Mobilization were applied to the: neck and L shoulder for 10 minutes. --NP    STM to bilat scalenes, suboccipitals, levator, upper traps, pec minor  R Post GH glide Gr II-III  Manual suboccipital stretch--NP  C3 upglide for L rotation --NP  Manual cervical traction--NP    Reuben participated in neuromuscular re-education activities to improve: Kinesthetic, Sense, Proprioception and Posture for 0 minutes. The following activities were included:    Manual resistance into scapular retraction and depression with rhythmic stab 5 min   Isometric holds in overhead position while in prone, manual cuing to achieve scapular retraction/depression      Written Home Exercises Provided: Patient educated to continue with previously issued HEP to tolerance      Pt demo good understanding of the education provided. Reuben demonstrated good return demonstration of activities.     Assessment   Reuben tolerated Tx well.  He was able to perform and progress with ex's/activities within appropriate level of muscular fatigue and without increased pain or numbness.  Appears to have improved overall postural awareness and symptom control. WIll monitor and attempt to progress with ex's/activities as tolerated.    Pt will continue to benefit from skilled PT intervention. Medical Necessity is demonstrated by:  Unable to participate fully in daily activities,  Requires skilled supervision to complete and progress HEP and Weakness.    Patient is making good progress towards established goals.    New/Revised goals: none at this time    Short Term Goals: 3 weeks   1. Pt will be able to perform 2x10 chin tuck w/ full AROM reporting no symptoms into his R UE  2. Pt will report no radicular symptoms into R UE with PROM into R side bend  3. Pt will be independent with HEP     Long Term Goals: 8 weeks   1. Pt will demonstrate low trap and mid trap MMT of at least 4+/5 to demonstrate improve scapular stability   2. Pt will be able to participate in Crossfit at [facility], making modifications when necessary reporting less than 2/10 pain  3. Pt will be able to utilize laptop (taking stretch breaks when necessary) and eat reporting no radiating symptoms and less than 2/10 pain    Plan     Next: single arm bent over rows, cable rows, OH pull down, thoracic wedge PA/ mobility drills, FR crunches    Continue with established Plan of Care towards PT goals.

## 2019-03-01 ENCOUNTER — PATIENT MESSAGE (OUTPATIENT)
Dept: ENDOSCOPY | Facility: HOSPITAL | Age: 54
End: 2019-03-01

## 2019-04-30 ENCOUNTER — OFFICE VISIT (OUTPATIENT)
Dept: ORTHOPEDICS | Facility: CLINIC | Age: 54
End: 2019-04-30
Payer: COMMERCIAL

## 2019-04-30 ENCOUNTER — HOSPITAL ENCOUNTER (OUTPATIENT)
Dept: RADIOLOGY | Facility: HOSPITAL | Age: 54
Discharge: HOME | End: 2019-04-30
Attending: ORTHOPAEDIC SURGERY
Payer: COMMERCIAL

## 2019-04-30 VITALS
BODY MASS INDEX: 26.6 KG/M2 | HEIGHT: 71 IN | WEIGHT: 190 LBS | HEART RATE: 60 BPM | DIASTOLIC BLOOD PRESSURE: 78 MMHG | SYSTOLIC BLOOD PRESSURE: 122 MMHG

## 2019-04-30 DIAGNOSIS — M79.674 GREAT TOE PAIN, RIGHT: Primary | ICD-10-CM

## 2019-04-30 DIAGNOSIS — M79.671 RIGHT FOOT PAIN: Primary | ICD-10-CM

## 2019-04-30 DIAGNOSIS — M79.671 RIGHT FOOT PAIN: ICD-10-CM

## 2019-04-30 PROCEDURE — 99242 PR OFFICE CONSULTATION,LEVEL II: ICD-10-PCS | Mod: S$GLB,,, | Performed by: ORTHOPAEDIC SURGERY

## 2019-04-30 PROCEDURE — 99999 PR PBB SHADOW E&M-EST. PATIENT-LVL III: ICD-10-PCS | Mod: PBBFAC,,, | Performed by: ORTHOPAEDIC SURGERY

## 2019-04-30 PROCEDURE — 99242 OFF/OP CONSLTJ NEW/EST SF 20: CPT | Mod: S$GLB,,, | Performed by: ORTHOPAEDIC SURGERY

## 2019-04-30 PROCEDURE — 73630 XR FOOT COMPLETE 3 VIEW RIGHT: ICD-10-PCS | Mod: 26,RT,, | Performed by: RADIOLOGY

## 2019-04-30 PROCEDURE — 73630 X-RAY EXAM OF FOOT: CPT | Mod: 26,RT,, | Performed by: RADIOLOGY

## 2019-04-30 PROCEDURE — 73630 X-RAY EXAM OF FOOT: CPT | Mod: TC,PO,RT

## 2019-04-30 PROCEDURE — 99999 PR PBB SHADOW E&M-EST. PATIENT-LVL III: CPT | Mod: PBBFAC,,, | Performed by: ORTHOPAEDIC SURGERY

## 2019-04-30 NOTE — LETTER
May 14, 2019      Jalen Bean MD  [address] [facility] [address] [place] [address]           [facility]  [address] [facility] [address] [place] [address]  Phone: [phone]          Patient: Reuben Harris   MR Number: [id number]   YOB: 1965   Date of Visit: 4/30/2019       Dear Dr. Jalen Bean:    Thank you for referring Reuben Harris to me for evaluation. Attached you will find relevant portions of my assessment and plan of care.    If you have questions, please do not hesitate to call me. I look forward to following Reuben Harris along with you.    Sincerely,    Kirk Garcia MD    Enclosure  CC:  No Recipients    If you would like to receive this communication electronically, please contact [email or web address] or [phone] to request more information on [employer] Link access.    For providers and/or their staff who would like to refer a patient to [facility], please contact us through our one-stop-shop provider referral line, [facility], at [phone].    If you feel you have received this communication in error or would no longer like to receive these types of communications, please e-mail [email or web address]

## 2019-05-14 NOTE — PROGRESS NOTES
"HPI: Reuben Harris is a 53 y.o. male who was referred to me by Dr. Bean and was seen in consultation today for right great toe pain. The pain in intermittent in nature and has been present for a couple of months. No previous treatment. It is not currently limiting his activities and he doesn't take any medication for pain. Pt denies weakness, numbness, and tingling.     No history of injury or trauma.   No h/o gout or Rheumatoid Arthritis.     PAST MEDICAL/SURGICAL/FAMILY/SOCIAL/ HISTORY: REVIEWED    ALLERGIES/MEDICATIONS: REVIEWED       Review of Systems:     Constitution: Negative.   HEENT: Negative.   Eyes: Negative.   Cardiovascular: Negative.   Respiratory: Negative.   Endocrine: Negative.   Hematologic/Lymphatic: Negative.   Skin: Negative.   Musculoskeletal: Positive for right great toe pain   Gastrointestinal: Negative.   Genitourinary: Negative.   Neurological: Negative.   Psychiatric/Behavioral: Negative.   Allergic/Immunologic: Negative.       PHYSICAL EXAM:  Vitals:    04/30/19 1336   BP: 122/78   Pulse: 60     Ht Readings from Last 1 Encounters:   04/30/19 5' 11" (1.803 m)     Wt Readings from Last 1 Encounters:   04/30/19 86.2 kg (190 lb)         GENERAL: Well developed, well nourished, no acute distress.  SKIN: Skin is intact. No atrophy, abrasions or lesions are noted.   Neurological: Normal mental status. Appropriate and conversant. Alert and oriented x 3.  GAIT: Walks with non-antalgic gait.    Right lower extremity compared with LLE:  2+ dorsalis pedis pulse.  Capillary refill < 3 seconds.  Normal range of motion tibiotalar and subtalar joints. Normal alignment of the forefoot and the hindfoot.  5/5 strength EHL, FHL, tibialis anterior, gastrocsoleus, tibialis posterior and peroneals. Sensation to light touch intact sural, saphenous, superficial peroneal and deep peroneal nerves. No swelling.  No lymphadenopathy, no masses or tumors palpated.  Dorsal bossing at 1st mtpj with joint motion " well maintained.       XRAYS:   3 views of right foot obtained and reviewed today reveal No evidence of fractures or dislocations. Dorsal spurring at the 1st mtpj with joint space well maintained.       ASSESSMENT:      Right great toe pain    PLAN:   I spent 15 minutes in consulation with the patient today. More than half the time was spent counseling the patient on his condition and the options for operative versus non-operative care.  He declined injection today. We discussed shoe modification and orthotics. Return to clinic if symptoms persist.

## 2020-04-08 ENCOUNTER — PATIENT MESSAGE (OUTPATIENT)
Dept: SPORTS MEDICINE | Facility: CLINIC | Age: 55
End: 2020-04-08

## 2020-10-05 ENCOUNTER — PATIENT MESSAGE (OUTPATIENT)
Dept: ADMINISTRATIVE | Facility: HOSPITAL | Age: 55
End: 2020-10-05

## 2021-03-10 ENCOUNTER — IMMUNIZATION (OUTPATIENT)
Dept: PRIMARY CARE CLINIC | Facility: CLINIC | Age: 56
End: 2021-03-10

## 2021-03-10 DIAGNOSIS — Z23 NEED FOR VACCINATION: Primary | ICD-10-CM

## 2021-03-10 PROCEDURE — 91303 PR SARSCOV2 VAC AD26 .5ML IM: CPT | Mod: S$GLB,,, | Performed by: INTERNAL MEDICINE

## 2021-03-10 PROCEDURE — 91303 PR SARSCOV2 VAC AD26 .5ML IM: ICD-10-PCS | Mod: S$GLB,,, | Performed by: INTERNAL MEDICINE

## 2021-03-10 PROCEDURE — 0031A PR IMMUNIZ ADMIN, SARS-COV-2 COVID-19 VACC, 5X10VP/0.5ML: CPT | Mod: CV19,S$GLB,, | Performed by: INTERNAL MEDICINE

## 2021-03-10 PROCEDURE — 0031A PR IMMUNIZ ADMIN, SARS-COV-2 COVID-19 VACC, 5X10VP/0.5ML: ICD-10-PCS | Mod: CV19,S$GLB,, | Performed by: INTERNAL MEDICINE

## 2021-04-15 ENCOUNTER — PATIENT MESSAGE (OUTPATIENT)
Dept: RESEARCH | Facility: HOSPITAL | Age: 56
End: 2021-04-15

## 2021-06-03 ENCOUNTER — TELEPHONE (OUTPATIENT)
Dept: INTERNAL MEDICINE | Facility: CLINIC | Age: 56
End: 2021-06-03